# Patient Record
Sex: FEMALE | Race: OTHER | Employment: OTHER | URBAN - METROPOLITAN AREA
[De-identification: names, ages, dates, MRNs, and addresses within clinical notes are randomized per-mention and may not be internally consistent; named-entity substitution may affect disease eponyms.]

---

## 2021-03-08 ENCOUNTER — VIRTUAL VISIT (OUTPATIENT)
Dept: FAMILY MEDICINE CLINIC | Age: 46
End: 2021-03-08

## 2021-03-08 DIAGNOSIS — E11.65 TYPE 2 DIABETES MELLITUS WITH HYPERGLYCEMIA, WITHOUT LONG-TERM CURRENT USE OF INSULIN (HCC): Primary | ICD-10-CM

## 2021-03-08 PROCEDURE — 99441 PR PHYS/QHP TELEPHONE EVALUATION 5-10 MIN: CPT | Performed by: NURSE PRACTITIONER

## 2021-03-08 RX ORDER — METFORMIN HYDROCHLORIDE 500 MG/1
500 TABLET ORAL 2 TIMES DAILY WITH MEALS
Qty: 180 TAB | Refills: 0 | Status: SHIPPED | OUTPATIENT
Start: 2021-03-08

## 2021-03-08 RX ORDER — METFORMIN HYDROCHLORIDE 500 MG/1
500 TABLET ORAL 2 TIMES DAILY WITH MEALS
Qty: 180 TAB | Refills: 0 | Status: SHIPPED | OUTPATIENT
Start: 2021-03-08 | End: 2021-03-08 | Stop reason: SDUPTHER

## 2021-03-08 NOTE — PROGRESS NOTES
: Gregor Bermudez  Patient identification verified with 2 identifiers. Patient offered video visit and declined. Consent: She and/or health care decision maker has provided verbal consent to proceed: Yes   Total Time: minutes: 5-10 minutes  Pursuant to the emergency declaration under the [facility] authority and the [employer] Act, this Virtual Telephone Visit was conducted to reduce the patient's risk of exposure to COVID-19. Assessment:   Diagnoses and all orders for this visit:    1. Type 2 diabetes mellitus with hyperglycemia, without long-term current use of insulin (HCC)  -     metFORMIN (GLUCOPHAGE) 500 mg tablet; Take 1 Tab by mouth two (2) times daily (with meals). Chery 1 tab 2 veces al ashley con comida      Plan:  I communicated with the patient and/or health care decision maker about the following: Follow-up and Dispositions    · Return for 2 months F2F LK (will have nonfasting labs at that time). new patient, due for labs    Adry Carrasquillo Ulises Becerra is a 40 y.o. female evaluated via telephone on 3/8/2021. Chief Complaint   Patient presents with    Diabetes     f/u      History of Present Illness She has not taken medication for 2 years. She was taking metformin. Her A1c was 8.2. She took 2 capsules a day. It has been 3 months since she took any. She moved to [place]. She was in [place], [place]. She doesn't remember how many mg it was, but thinks it was 500 mg. She had diarrhea but then it got better. Her arms and hands are numb and heavy for the last week. Was first diagnosed with diabetes 9 years ago when she had her baby. It went away but then came back. Her mom and dad have diabetes. No physical exam performed due to telephone visit.   I affirm this is a Patient Initiated Episode with a Patient who has not had a related appointment within my department in the past 7 days or scheduled within the next 24 hours. ANNA Acuna expressed understanding and agreed to this plan.

## 2021-03-08 NOTE — PROGRESS NOTES
I called patient and explained to her how to look up [facility] near her address on her smart phone if she needs to go to the ED. I also explained to patient care card process if she does go to ED for worsening symptoms. I also rerouted RX to [facility] in [facility] to save her money. Katlyn Richardson RN

## 2021-07-24 ENCOUNTER — APPOINTMENT (OUTPATIENT)
Dept: GENERAL RADIOLOGY | Age: 46
End: 2021-07-24
Attending: EMERGENCY MEDICINE
Payer: COMMERCIAL

## 2021-07-24 ENCOUNTER — HOSPITAL ENCOUNTER (EMERGENCY)
Age: 46
Discharge: HOME | End: 2021-07-24
Attending: EMERGENCY MEDICINE
Payer: COMMERCIAL

## 2021-07-24 VITALS
HEART RATE: 76 BPM | HEIGHT: 62 IN | OXYGEN SATURATION: 99 % | RESPIRATION RATE: 14 BRPM | TEMPERATURE: 98.2 F | WEIGHT: 140 LBS | BODY MASS INDEX: 25.76 KG/M2 | DIASTOLIC BLOOD PRESSURE: 80 MMHG | SYSTOLIC BLOOD PRESSURE: 137 MMHG

## 2021-07-24 DIAGNOSIS — M25.521 RIGHT ELBOW PAIN: ICD-10-CM
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
## 2021-07-24 PROCEDURE — 99283 EMERGENCY DEPT VISIT LOW MDM: CPT

## 2021-07-24 PROCEDURE — 74011250637 HC RX REV CODE- 250/637: Performed by: EMERGENCY MEDICINE

## 2021-07-24 PROCEDURE — 73080 X-RAY EXAM OF ELBOW: CPT

## 2021-07-24 RX ORDER — NAPROXEN 500 MG/1
500 TABLET ORAL 2 TIMES DAILY WITH MEALS
Qty: 20 TABLET | Refills: 0 | Status: SHIPPED | OUTPATIENT
Start: 2021-07-24 | End: 2021-08-03

## 2021-07-24 RX ORDER — IBUPROFEN 800 MG/1
800 TABLET ORAL ONCE
Status: COMPLETED | OUTPATIENT
Start: 2021-07-24 | End: 2021-07-24

## 2021-07-24 RX ADMIN — IBUPROFEN 800 MG: 800 TABLET, FILM COATED ORAL at 08:58

## 2021-07-24 NOTE — ED TRIAGE NOTES
Stephan used for triage #[id number]. Pt states she having right elbow pain that is worse since picking up a case of bottled water.

## 2021-07-24 NOTE — ED PROVIDER NOTES
60-year-old female history of diabetes presents to the emergency department today with acute on chronic right elbow pain. She works in [employer] and does repetitive motions. Yesterday she lifted a case of water bottles and has had worse pain in her right elbow since that time. No fever or other injuries. The history is provided by the patient. Arm Pain   This is a chronic problem. The problem occurs constantly. The problem has been rapidly worsening. The pain is present in the right elbow. The quality of the pain is described as aching. The pain is severe. Pertinent negatives include no numbness, full range of motion and no stiffness. She has tried nothing for the symptoms. There has been no history of extremity trauma. Past Medical History:   Diagnosis Date    Diabetes ([facility] [address] 06/11/2012    approximate date       No past surgical history on file. Family History:   Problem Relation Age of Onset    Diabetes Mother     Diabetes Father        Social History     Socioeconomic History    Marital status:      Spouse name: Not on file    Number of children: Not on file    Years of education: Not on file    Highest education level: Not on file   Occupational History    Not on file   Tobacco Use    Smoking status: Never Smoker    Smokeless tobacco: Never Used   Substance and Sexual Activity    Alcohol use: Never    Drug use: Never    Sexual activity: Not on file   Other Topics Concern    Not on file   Social History Narrative    Not on file     Social Determinants of Health     Financial Resource Strain:     Difficulty of Paying Living Expenses:    Food Insecurity:     Worried About Running Out of Food in the Last Year:     [address] in the Last Year:    Transportation Needs:     Lack of Transportation (Medical):      Lack of Transportation (Non-Medical):    Physical Activity:     Days of Exercise per Week:     Minutes of Exercise per Session:    Stress:     Feeling of Stress :    Social Connections:     Frequency of Communication with Friends and Family:     Frequency of Social Gatherings with Friends and Family:     Attends Faith Services:     Active Member of Clubs or Organizations:     Attends Club or Organization Meetings:     Marital Status:    Intimate Partner Violence:     Fear of Current or Ex-Partner:     Emotionally Abused:     Physically Abused:     Sexually Abused: ALLERGIES: Patient has no known allergies. Review of Systems   Constitutional: Negative for fatigue and fever. HENT: Negative for sneezing and sore throat. Respiratory: Negative for cough and shortness of breath. Cardiovascular: Negative for chest pain and leg swelling. Gastrointestinal: Negative for abdominal pain, diarrhea, nausea and vomiting. Genitourinary: Negative for difficulty urinating and dysuria. Musculoskeletal: Negative for arthralgias, myalgias and stiffness. Skin: Negative for color change and rash. Neurological: Negative for weakness, numbness and headaches. Psychiatric/Behavioral: Negative for agitation and behavioral problems. Vitals:    07/24/21 0751   BP: 137/80   Pulse: 76   Resp: 14   Temp: 98.2 °F (36.8 °C)   SpO2: 99%   Weight: 63.5 kg (140 lb)   Height: 5' 2\" (1.575 m)            Physical Exam  Vitals and nursing note reviewed. Constitutional:       General: She is not in acute distress. Appearance: Normal appearance. She is normal weight. She is not ill-appearing, toxic-appearing or diaphoretic. HENT:      Head: Normocephalic and atraumatic. Nose: Nose normal.      Mouth/Throat:      Mouth: Mucous membranes are moist.      Pharynx: Oropharynx is clear. Eyes:      Extraocular Movements: Extraocular movements intact. Conjunctiva/sclera: Conjunctivae normal.      Pupils: Pupils are equal, round, and reactive to light. Cardiovascular:      Rate and Rhythm: Normal rate. Pulses: Normal pulses.    Pulmonary: Effort: Pulmonary effort is normal. No respiratory distress. Musculoskeletal:         General: Tenderness (Tenderness over the right olecranon process, distally neurovascular intact, no significant soft tissue swelling) present. No swelling, deformity or signs of injury. Normal range of motion. Cervical back: Normal range of motion and neck supple. No tenderness. Right lower leg: No edema. Left lower leg: No edema. Skin:     General: Skin is warm and dry. Capillary Refill: Capillary refill takes less than 2 seconds. Neurological:      General: No focal deficit present. Mental Status: She is alert and oriented to person, place, and time. Psychiatric:         Mood and Affect: Mood normal.         Behavior: Behavior normal.          MDM  Number of Diagnoses or Management Options  Diagnosis management comments: 55-year-old female presents as above with repetitive use injury to her right elbow. Plan to treat with anti-inflammatories, Ace wrap, follow-up with primary care, return if needed.          Procedures

## 2021-07-24 NOTE — DISCHARGE INSTRUCTIONS
Thank you for allowing us to provide you with medical care today. We realize that you have many choices for your emergency care needs. We thank you for choosing [facility]. Please choose us in the future for any continued health care needs. We hope we addressed all of your medical concerns. We strive to provide excellent quality care in the Emergency Department. Anything less than excellent does not meet our expectations. The exam and treatment you received in the Emergency Department were for an emergent problem and are not intended as complete care. It is important that you follow up with a doctor, nurse practitioner, or physician's assistant for ongoing care. If your symptoms worsen or you do not improve as expected and you are unable to reach your usual health care provider, you should return to the Emergency Department. We are available 24 hours a day. Take this sheet with you when you go to your follow-up visit. If you have any problem arranging the follow-up visit, contact the Emergency Department immediately. Make an appointment your family doctor for follow up of this visit. Return to the ER if you are unable to be seen in a timely manner.